# Patient Record
Sex: FEMALE | Race: WHITE | NOT HISPANIC OR LATINO
[De-identification: names, ages, dates, MRNs, and addresses within clinical notes are randomized per-mention and may not be internally consistent; named-entity substitution may affect disease eponyms.]

---

## 2018-12-27 ENCOUNTER — TELEPHONE (OUTPATIENT)
Dept: SCHEDULING | Age: 18
End: 2018-12-27

## 2018-12-28 ENCOUNTER — APPOINTMENT (OUTPATIENT)
Dept: URGENT CARE | Age: 18
End: 2018-12-28

## 2019-04-29 ENCOUNTER — TELEPHONE (OUTPATIENT)
Dept: SCHEDULING | Age: 19
End: 2019-04-29

## 2019-04-29 ENCOUNTER — WALK IN (OUTPATIENT)
Dept: URGENT CARE | Age: 19
End: 2019-04-29

## 2019-04-29 VITALS
BODY MASS INDEX: 27.32 KG/M2 | HEART RATE: 80 BPM | WEIGHT: 170 LBS | HEIGHT: 66 IN | SYSTOLIC BLOOD PRESSURE: 110 MMHG | RESPIRATION RATE: 16 BRPM | DIASTOLIC BLOOD PRESSURE: 80 MMHG | TEMPERATURE: 98.9 F

## 2019-04-29 DIAGNOSIS — H92.01 OTALGIA OF RIGHT EAR: Primary | ICD-10-CM

## 2019-04-29 DIAGNOSIS — K08.89 PAIN, DENTAL: ICD-10-CM

## 2019-04-29 PROCEDURE — 99213 OFFICE O/P EST LOW 20 MIN: CPT | Performed by: NURSE PRACTITIONER

## 2019-04-29 RX ORDER — GLUCOSAMINE HCL/CHONDROITIN SU 500-400 MG
CAPSULE ORAL
COMMUNITY
Start: 2018-12-05

## 2019-04-29 RX ORDER — ERGOCALCIFEROL 1.25 MG/1
CAPSULE ORAL
COMMUNITY
Start: 2019-03-29

## 2019-04-29 RX ORDER — PROCHLORPERAZINE 25 MG/1
SUPPOSITORY RECTAL
COMMUNITY
Start: 2018-11-19

## 2019-04-29 RX ORDER — INSULIN LISPRO 100 [IU]/ML
INJECTION, SOLUTION INTRAVENOUS; SUBCUTANEOUS
COMMUNITY
Start: 2019-04-11

## 2019-04-29 ASSESSMENT — ENCOUNTER SYMPTOMS
HEADACHES: 1
COUGH: 0
ALLERGIC/IMMUNOLOGIC NEGATIVE: 1
EYES NEGATIVE: 1
SORE THROAT: 0
RHINORRHEA: 0
RESPIRATORY NEGATIVE: 1
CONSTITUTIONAL NEGATIVE: 1
FEVER: 0
FATIGUE: 0
CHILLS: 0

## 2022-11-21 ENCOUNTER — APPOINTMENT (OUTPATIENT)
Dept: GENERAL RADIOLOGY | Facility: HOSPITAL | Age: 22
End: 2022-11-21
Payer: COMMERCIAL

## 2022-11-21 ENCOUNTER — HOSPITAL ENCOUNTER (EMERGENCY)
Facility: HOSPITAL | Age: 22
Discharge: HOME | End: 2022-11-21
Payer: COMMERCIAL

## 2022-11-21 VITALS
BODY MASS INDEX: 31.82 KG/M2 | WEIGHT: 198 LBS | HEIGHT: 66 IN | OXYGEN SATURATION: 99 % | TEMPERATURE: 98 F | RESPIRATION RATE: 18 BRPM | HEART RATE: 78 BPM | DIASTOLIC BLOOD PRESSURE: 85 MMHG | SYSTOLIC BLOOD PRESSURE: 131 MMHG

## 2022-11-21 DIAGNOSIS — S93.402A MODERATE LEFT ANKLE SPRAIN, INITIAL ENCOUNTER: Primary | ICD-10-CM

## 2022-11-21 PROCEDURE — 99283 EMERGENCY DEPT VISIT LOW MDM: CPT

## 2022-11-21 PROCEDURE — 73610 X-RAY EXAM OF ANKLE: CPT

## 2022-11-21 NOTE — ED INITIAL ASSESSMENT (HPI)
Pt was stepping down a step and rolled her left ankle. Pt fell onto the floor but denies hitting her head and denies LOC.  Pt ambulated into the ER

## 2022-11-22 NOTE — ED QUICK NOTES
Pt discharged in stable condition. Discharge instructions, follow up care and rx reviewed with pt bedside. Pt denies any questions and/or concerns.

## 2022-11-22 NOTE — DISCHARGE INSTRUCTIONS
Ice x 2 days  Ibuprofen as needed for pain  Elevate  Follow up with your doctor or the ortho specialist  if no improvement in 5-7 days

## 2022-11-22 NOTE — ED QUICK NOTES
Pt resting comfortably in bed, pt states was walking and stepped down wrong rolling ankle. Swelling noted but no deformity noted. Pt given ice pack to apply to left ankle.

## 2024-05-04 ENCOUNTER — V-VISIT (OUTPATIENT)
Dept: URGENT CARE | Age: 24
End: 2024-05-04

## 2024-05-04 VITALS
TEMPERATURE: 97.9 F | BODY MASS INDEX: 32.96 KG/M2 | OXYGEN SATURATION: 100 % | HEART RATE: 66 BPM | WEIGHT: 210 LBS | HEIGHT: 67 IN | DIASTOLIC BLOOD PRESSURE: 78 MMHG | SYSTOLIC BLOOD PRESSURE: 135 MMHG | RESPIRATION RATE: 18 BRPM

## 2024-05-04 DIAGNOSIS — H65.91 FLUID LEVEL BEHIND TYMPANIC MEMBRANE OF RIGHT EAR: Primary | ICD-10-CM

## 2024-05-04 DIAGNOSIS — J35.1 ENLARGED TONSILS: ICD-10-CM

## 2024-05-04 LAB
INTERNAL PROCEDURAL CONTROLS ACCEPTABLE: YES
S PYO AG THROAT QL IA.RAPID: NEGATIVE
TEST LOT EXPIRATION DATE: NORMAL
TEST LOT NUMBER: NORMAL

## 2024-05-04 ASSESSMENT — ENCOUNTER SYMPTOMS
COUGH: 0
VOMITING: 0
WHEEZING: 0
APPETITE CHANGE: 0
CHILLS: 0
LIGHT-HEADEDNESS: 0
NAUSEA: 0
SINUS PAIN: 0
DIARRHEA: 0
HEADACHES: 0
ABDOMINAL PAIN: 0
SORE THROAT: 0
SHORTNESS OF BREATH: 0
EYES NEGATIVE: 1
DIZZINESS: 0
SINUS PRESSURE: 0
ACTIVITY CHANGE: 0
FEVER: 0
TROUBLE SWALLOWING: 0
RHINORRHEA: 0
CHEST TIGHTNESS: 0

## (undated) NOTE — LETTER
Date & Time: 11/21/2022, 7:21 PM  Patient: Eden Capellan  Encounter Provider(s):    BRENDA Mcrae       To Whom It May Concern:    Eden Capellan was seen and treated in our department on 11/21/2022. She may return to work 11/24/22.     If you have any questions or concerns, please do not hesitate to call.        _____________________________  Physician/[facility] Signature